# Patient Record
Sex: FEMALE | Race: WHITE | URBAN - METROPOLITAN AREA
[De-identification: names, ages, dates, MRNs, and addresses within clinical notes are randomized per-mention and may not be internally consistent; named-entity substitution may affect disease eponyms.]

---

## 2017-03-14 ENCOUNTER — APPOINTMENT (OUTPATIENT)
Dept: OCCUPATIONAL MEDICINE | Age: 32
End: 2017-03-14
Attending: EMERGENCY MEDICINE

## 2022-01-13 PROBLEM — O02.1 MISSED ABORTION: Status: ACTIVE | Noted: 2022-01-13

## 2022-12-06 ENCOUNTER — OFFICE VISIT (OUTPATIENT)
Dept: ALLERGY | Facility: CLINIC | Age: 37
End: 2022-12-06
Payer: COMMERCIAL

## 2022-12-06 ENCOUNTER — LAB VISIT (OUTPATIENT)
Dept: LAB | Facility: HOSPITAL | Age: 37
End: 2022-12-06
Payer: COMMERCIAL

## 2022-12-06 VITALS
WEIGHT: 157.19 LBS | HEIGHT: 64 IN | SYSTOLIC BLOOD PRESSURE: 130 MMHG | DIASTOLIC BLOOD PRESSURE: 78 MMHG | BODY MASS INDEX: 26.84 KG/M2

## 2022-12-06 DIAGNOSIS — L50.8 ACUTE URTICARIA: Primary | ICD-10-CM

## 2022-12-06 DIAGNOSIS — Z88.9 DRUG ALLERGY: ICD-10-CM

## 2022-12-06 DIAGNOSIS — L50.8 ACUTE URTICARIA: ICD-10-CM

## 2022-12-06 LAB — IGE SERPL-ACNC: 50 IU/ML (ref 0–100)

## 2022-12-06 PROCEDURE — 86003 ALLG SPEC IGE CRUDE XTRC EA: CPT | Performed by: STUDENT IN AN ORGANIZED HEALTH CARE EDUCATION/TRAINING PROGRAM

## 2022-12-06 PROCEDURE — 99204 PR OFFICE/OUTPT VISIT, NEW, LEVL IV, 45-59 MIN: ICD-10-PCS | Mod: S$GLB,,, | Performed by: STUDENT IN AN ORGANIZED HEALTH CARE EDUCATION/TRAINING PROGRAM

## 2022-12-06 PROCEDURE — 3075F PR MOST RECENT SYSTOLIC BLOOD PRESS GE 130-139MM HG: ICD-10-PCS | Mod: CPTII,S$GLB,, | Performed by: STUDENT IN AN ORGANIZED HEALTH CARE EDUCATION/TRAINING PROGRAM

## 2022-12-06 PROCEDURE — 82785 ASSAY OF IGE: CPT | Performed by: STUDENT IN AN ORGANIZED HEALTH CARE EDUCATION/TRAINING PROGRAM

## 2022-12-06 PROCEDURE — 3078F PR MOST RECENT DIASTOLIC BLOOD PRESSURE < 80 MM HG: ICD-10-PCS | Mod: CPTII,S$GLB,, | Performed by: STUDENT IN AN ORGANIZED HEALTH CARE EDUCATION/TRAINING PROGRAM

## 2022-12-06 PROCEDURE — 3008F BODY MASS INDEX DOCD: CPT | Mod: CPTII,S$GLB,, | Performed by: STUDENT IN AN ORGANIZED HEALTH CARE EDUCATION/TRAINING PROGRAM

## 2022-12-06 PROCEDURE — 99204 OFFICE O/P NEW MOD 45 MIN: CPT | Mod: S$GLB,,, | Performed by: STUDENT IN AN ORGANIZED HEALTH CARE EDUCATION/TRAINING PROGRAM

## 2022-12-06 PROCEDURE — 3008F PR BODY MASS INDEX (BMI) DOCUMENTED: ICD-10-PCS | Mod: CPTII,S$GLB,, | Performed by: STUDENT IN AN ORGANIZED HEALTH CARE EDUCATION/TRAINING PROGRAM

## 2022-12-06 PROCEDURE — 3078F DIAST BP <80 MM HG: CPT | Mod: CPTII,S$GLB,, | Performed by: STUDENT IN AN ORGANIZED HEALTH CARE EDUCATION/TRAINING PROGRAM

## 2022-12-06 PROCEDURE — 1159F PR MEDICATION LIST DOCUMENTED IN MEDICAL RECORD: ICD-10-PCS | Mod: CPTII,S$GLB,, | Performed by: STUDENT IN AN ORGANIZED HEALTH CARE EDUCATION/TRAINING PROGRAM

## 2022-12-06 PROCEDURE — 99999 PR PBB SHADOW E&M-EST. PATIENT-LVL III: CPT | Mod: PBBFAC,,, | Performed by: STUDENT IN AN ORGANIZED HEALTH CARE EDUCATION/TRAINING PROGRAM

## 2022-12-06 PROCEDURE — 99999 PR PBB SHADOW E&M-EST. PATIENT-LVL III: ICD-10-PCS | Mod: PBBFAC,,, | Performed by: STUDENT IN AN ORGANIZED HEALTH CARE EDUCATION/TRAINING PROGRAM

## 2022-12-06 PROCEDURE — 1159F MED LIST DOCD IN RCRD: CPT | Mod: CPTII,S$GLB,, | Performed by: STUDENT IN AN ORGANIZED HEALTH CARE EDUCATION/TRAINING PROGRAM

## 2022-12-06 PROCEDURE — 86003 ALLG SPEC IGE CRUDE XTRC EA: CPT | Mod: 59 | Performed by: STUDENT IN AN ORGANIZED HEALTH CARE EDUCATION/TRAINING PROGRAM

## 2022-12-06 PROCEDURE — 3075F SYST BP GE 130 - 139MM HG: CPT | Mod: CPTII,S$GLB,, | Performed by: STUDENT IN AN ORGANIZED HEALTH CARE EDUCATION/TRAINING PROGRAM

## 2022-12-06 RX ORDER — LORATADINE 10 MG/1
10 TABLET ORAL DAILY
COMMUNITY

## 2022-12-06 NOTE — PROGRESS NOTES
ALLERGY & IMMUNOLOGY CLINIC -  NEW  PATIENT     HISTORY OF PRESENT ILLNESS     Patient ID: Lennie Masters is a 37 y.o. female    CC: hives    HPI: 37 year old presents for evaluation of hives. States that the hives started 4 days prior to presentation and have been affecting her entire body. States that she has been loratadine 10mg daily and has experienced relief of symptoms. Did initially require Benadryl every 4-6 hours but has not required antihistamines today. Occurred one time last month and lasted for a full day and resolved over the course of the day following benadryl administration. Denies URI symptoms, nausea, vomiting, stomach cramps, wheezing, shortness of breath. No worsening with NSAIDs, exercise, alcohol intake or any other known trigger. Does state symptoms have been worsened around the new dog at home that they have had for 4 months    Penicillin: Develops a rash    H/o Asthma: denies  H/o Eczema: denies  H/o Rhinitis: denies  Oral Allergy:  denies  Food Allergy: denies  Venom Allergy: denies  Latex Allergy: denies  Env/Occ: denies any environmental or occupational exposures     REVIEW OF SYSTEMS       Balance of review of systems negative except as mentioned above     MEDICAL HISTORY     MedHx: active problems reviewed  SurgHx:   Past Surgical History:   Procedure Laterality Date    DILATION AND CURETTAGE OF UTERUS USING SUCTION N/A 1/13/2022    Procedure: DILATION AND CURETTAGE, UTERUS, USING SUCTION - Ultrasound;  Surgeon: Adolfo Ng MD;  Location: [facility];  Service: OB/GYN;  Laterality: N/A;       SocHx:   -Smokes 1-2 cigarettes per week  -Alcohol Use: Occasional    -Pets: 1 dog  -Mold/Water Damage: denies  -School/Work: Works as a dialysis nurse    FamHx:   -Asthma:  -Atopic Dermatitis:  -Rhinitis: Father  -Food Allergy:    Otherwise no Family History of asthma, allergic rhinitis, atopic dermatitis, drug allergy, food allergy, venom allergy or immune deficiency.     Allergies: see  "below  Medications: MAR reviewed       PHYSICAL EXAM     VS: /78 (BP Location: Right arm, Patient Position: Sitting, BP Method: Large (Manual))   Ht 5' 4" (1.626 m)   Wt 71.3 kg (157 lb 3 oz)   BMI 26.98 kg/m²   GENERAL: awake, alert, cooperative with exam  EYES: PERRL, EOMI, no conjunctival injection, no discharge, no infraorbital shiners  EARS: external auditory canals normal B/L, TM normal B/L  NOSE: NT 2+ and pink B/L, no stringing mucous, no polyps  ORAL: MMM, no ulcers, no thrush, no cobblestoning  NECK: supple, trachea midline, no cervical or submandibular LAD  LUNGS: CTAB, no w/r/c, no increased WOB  HEART: Normal Rate and regular rhythm, normal S1/S2, no m/g/r  EXTREMITIES: +2 distal pulses, no c/c/e  DERM: no rashes, no skin breaks  NEURO: normal gait, no facial asymmetry     ALLERGEN TESTING     Immunocaps: Ordered Today     CHART REVIEW     Previous Notes     ASSESSMENT & PLAN     Lennie Masters is a 37 y.o. female with     Acute urticaria  -     Dermatophagoides [place]; Future; Expected date: 12/06/2022  -     Dermatophagoides Pteronyssinus; Future; Expected date: 12/06/2022  -     [place]; Future; Expected date: 12/06/2022  -     [place]; Future; Expected date: 12/06/2022  -     [place]; Future; Expected date: 12/06/2022  -     English Plantain; Future; Expected date: 12/06/2022  -     Oak; Future; Expected date: 12/06/2022  -     Pecan; Future; Expected date: 12/06/2022  -     Stevens Elder; Future; Expected date: 12/06/2022  -     Ragweed; Future; Expected date: 12/06/2022  -     Alternaria; Future; Expected date: 12/06/2022  -     Aspergillus; Future; Expected date: 12/06/2022  -     Cat; Future; Expected date: 12/06/2022  -     Cockroach; Future; Expected date: 12/06/2022  -     Dog; Future; Expected date: 12/06/2022  -     IgE; Future; Expected date: 12/06/2022    Drug allergy    MDM: 37 year old presents for evaluation of diffuse, acute onset urticaria. Urticaria responsive to " antihistamine therapy and lacking identifiable trigger. Recommend to increase 2nd generation antihistamine to BID dosing to avoid necessity of first generation antihistamine. Maximum dose for hives would be loratadine 20mg BID. Additionally sending for indoor and outdoor inhalants today. Can consider amoxicillin challenge +/- PCN skin testing if desired    Follow up: As Needed      Mina Dao MD

## 2022-12-09 LAB
A ALTERNATA IGE QN: <0.1 KU/L
A FUMIGATUS IGE QN: <0.1 KU/L
BERMUDA GRASS IGE QN: <0.1 KU/L
CAT DANDER IGE QN: <0.1 KU/L
CEDAR IGE QN: <0.1 KU/L
D FARINAE IGE QN: <0.1 KU/L
D PTERONYSS IGE QN: <0.1 KU/L
DEPRECATED A ALTERNATA IGE RAST QL: NORMAL
DEPRECATED A FUMIGATUS IGE RAST QL: NORMAL
DEPRECATED BERMUDA GRASS IGE RAST QL: NORMAL
DEPRECATED CAT DANDER IGE RAST QL: NORMAL
DEPRECATED CEDAR IGE RAST QL: NORMAL
DEPRECATED D FARINAE IGE RAST QL: NORMAL
DEPRECATED D PTERONYSS IGE RAST QL: NORMAL
DEPRECATED DOG DANDER IGE RAST QL: NORMAL
DEPRECATED ELDER IGE RAST QL: NORMAL
DEPRECATED ENGL PLANTAIN IGE RAST QL: NORMAL
DEPRECATED PECAN/HICK TREE IGE RAST QL: NORMAL
DEPRECATED ROACH IGE RAST QL: NORMAL
DEPRECATED TIMOTHY IGE RAST QL: NORMAL
DEPRECATED WEST RAGWEED IGE RAST QL: NORMAL
DEPRECATED WHITE OAK IGE RAST QL: ABNORMAL
DOG DANDER IGE QN: <0.1 KU/L
ELDER IGE QN: <0.1 KU/L
ENGL PLANTAIN IGE QN: <0.1 KU/L
PECAN/HICK TREE IGE QN: <0.1 KU/L
ROACH IGE QN: <0.1 KU/L
TIMOTHY IGE QN: <0.1 KU/L
WEST RAGWEED IGE QN: <0.1 KU/L
WHITE OAK IGE QN: 4.59 KU/L

## 2022-12-12 ENCOUNTER — PATIENT MESSAGE (OUTPATIENT)
Dept: ALLERGY | Facility: CLINIC | Age: 37
End: 2022-12-12
Payer: COMMERCIAL